# Patient Record
Sex: FEMALE | NOT HISPANIC OR LATINO | URBAN - METROPOLITAN AREA
[De-identification: names, ages, dates, MRNs, and addresses within clinical notes are randomized per-mention and may not be internally consistent; named-entity substitution may affect disease eponyms.]

---

## 2018-06-04 ENCOUNTER — OFFICE VISIT (OUTPATIENT)
Dept: URBAN - METROPOLITAN AREA CLINIC 32 | Facility: CLINIC | Age: 73
End: 2018-06-04
Payer: MEDICARE

## 2018-06-04 PROCEDURE — 67028 INJECTION EYE DRUG: CPT | Performed by: OPHTHALMOLOGY

## 2018-06-04 PROCEDURE — 92134 CPTRZ OPH DX IMG PST SGM RTA: CPT | Performed by: OPHTHALMOLOGY

## 2018-06-04 PROCEDURE — 92014 COMPRE OPH EXAM EST PT 1/>: CPT | Performed by: OPHTHALMOLOGY

## 2018-06-04 ASSESSMENT — INTRAOCULAR PRESSURE
OS: 11
OD: 11

## 2018-06-04 NOTE — IMPRESSION/PLAN
Impression: Central retinal vein occlusion, left eye, with macular edema: H34.8120. OS. Condition: unstable. Vision: vision affected. Plan: Discussed diagnosis in detail with patient. Discussed risks of progression. Based on today's exam, diagnostic studies and review of records, recommend Intravitreal Injection Treatment to help reduce the swelling in order to prevent a further reduction in vision. An examination that was significantly and separately identifiable from the procedure was performed today. Discussed the risks and benefits of tx. Patient elects to proceed with recommendation. OCT shows CME centrally and peripapillary OS.

## 2018-06-04 NOTE — IMPRESSION/PLAN
Impression: Dry eye syndrome of bilateral lacrimal glands: H04.123. OU. Plan: Dry eyes account for the patient's complaints. There is no evidence of permanent changes to the cornea. Explained condition does not have a cure and will need artificial tears for maintenance.

## 2018-06-20 ENCOUNTER — OFFICE VISIT (OUTPATIENT)
Dept: URBAN - METROPOLITAN AREA CLINIC 32 | Facility: CLINIC | Age: 73
End: 2018-06-20
Payer: MEDICARE

## 2018-06-20 PROCEDURE — 99214 OFFICE O/P EST MOD 30 MIN: CPT | Performed by: OPTOMETRIST

## 2018-06-20 ASSESSMENT — INTRAOCULAR PRESSURE
OS: 12
OD: 10

## 2018-06-20 NOTE — IMPRESSION/PLAN
Impression: Central retinal vein occlusion, left eye, with macular edema: H34.8120. OS. Condition: unstable. Vision: vision affected. Plan: Discussed diagnosis in detail with patient. Discussed risks of progression. Based on today's exam, diagnostic studies and review of records, recommend Intravitreal Injection Treatment to help reduce the swelling in order to prevent a further reduction in vision.  had injection 2 weeks ago

## 2018-08-27 ENCOUNTER — OFFICE VISIT (OUTPATIENT)
Dept: URBAN - METROPOLITAN AREA CLINIC 32 | Facility: CLINIC | Age: 73
End: 2018-08-27
Payer: MEDICARE

## 2018-08-27 PROCEDURE — 99213 OFFICE O/P EST LOW 20 MIN: CPT | Performed by: OPHTHALMOLOGY

## 2018-08-27 PROCEDURE — 92134 CPTRZ OPH DX IMG PST SGM RTA: CPT | Performed by: OPHTHALMOLOGY

## 2018-08-27 PROCEDURE — 67028 INJECTION EYE DRUG: CPT | Performed by: OPHTHALMOLOGY

## 2018-08-27 ASSESSMENT — INTRAOCULAR PRESSURE
OD: 12
OS: 12

## 2018-08-27 NOTE — IMPRESSION/PLAN
Impression: Central retinal vein occlusion, left eye, with macular edema: H34.8120. OS. Condition: unstable. Vision: vision affected. s/p AV OS #1  06/04/2018. Plan: Discussed diagnosis in detail with patient. Discussed risks of progression. Based on today's exam, diagnostic studies and review of records, recommend to continue with Intravitreal Injection Treatment to help reduce the swelling and bleeding  in order to prevent a further reduction in vision. An examination that was significantly and separately identifiable from the procedure was performed today. Discussed the risks and benefits of tx. Patient elects to proceed with recommendation. OCT shows poor registration OS.

## 2018-10-01 ENCOUNTER — OFFICE VISIT (OUTPATIENT)
Dept: URBAN - METROPOLITAN AREA CLINIC 32 | Facility: CLINIC | Age: 73
End: 2018-10-01
Payer: MEDICARE

## 2018-10-01 DIAGNOSIS — H04.123 DRY EYE SYNDROME OF BILATERAL LACRIMAL GLANDS: ICD-10-CM

## 2018-10-01 PROCEDURE — 92134 CPTRZ OPH DX IMG PST SGM RTA: CPT | Performed by: OPHTHALMOLOGY

## 2018-10-01 PROCEDURE — 67028 INJECTION EYE DRUG: CPT | Performed by: OPHTHALMOLOGY

## 2018-10-01 PROCEDURE — 99213 OFFICE O/P EST LOW 20 MIN: CPT | Performed by: OPHTHALMOLOGY

## 2018-10-01 ASSESSMENT — INTRAOCULAR PRESSURE
OD: 13
OS: 14

## 2018-10-01 NOTE — IMPRESSION/PLAN
Impression: Central retinal vein occlusion, left eye, with macular edema: H34.8120. OS. Condition: unstable. Vision: vision affected. s/p AV OS #2 8/27/2018,#1  06/04/2018. Plan: Discussed diagnosis in detail with patient. Discussed risks of progression. Based on today's exam, diagnostic studies and review of records, recommend to continue with Intravitreal Injection Treatment AVASTIN in LEFT EYE to help reduce the swelling and bleeding  in order to prevent a further reduction in vision. An examination that was significantly and separately identifiable from the procedure was performed today. Discussed the risks and benefits of tx. Patient elects to proceed with recommendation. OCT shows poor registration OS.

## 2019-01-28 ENCOUNTER — OFFICE VISIT (OUTPATIENT)
Dept: URBAN - METROPOLITAN AREA CLINIC 32 | Facility: CLINIC | Age: 74
End: 2019-01-28
Payer: MEDICARE

## 2019-01-28 PROCEDURE — 92134 CPTRZ OPH DX IMG PST SGM RTA: CPT | Performed by: OPHTHALMOLOGY

## 2019-01-28 PROCEDURE — 99213 OFFICE O/P EST LOW 20 MIN: CPT | Performed by: OPHTHALMOLOGY

## 2019-01-28 NOTE — IMPRESSION/PLAN
Impression: Central retinal vein occlusion, left eye, with macular edema: H34.8120. OS. Condition: unstable. Vision: vision affected. s/p AV OS # 3 10/1/2018, #2 8/27/2018,#1  06/04/2018. Plan: Discussed diagnosis in detail with patient. Discussed risks of progression. Based on today's exam, diagnostic studies and review of records, recommend to continue with Intravitreal Injection Treatment AVASTIN in LEFT EYE to help reduce the swelling and bleeding  in order to prevent a further reduction in vision. Discussed the risks and benefits of tx. Patient elects to proceed with recommendation. OCT shows ? decreased leakage OS since 10/1/18 Exam and OCT show the macula is stable OD.

## 2019-02-25 ENCOUNTER — OFFICE VISIT (OUTPATIENT)
Dept: URBAN - METROPOLITAN AREA CLINIC 32 | Facility: CLINIC | Age: 74
End: 2019-02-25
Payer: MEDICARE

## 2019-02-25 PROCEDURE — 67028 INJECTION EYE DRUG: CPT | Performed by: OPHTHALMOLOGY

## 2019-02-25 PROCEDURE — 92134 CPTRZ OPH DX IMG PST SGM RTA: CPT | Performed by: OPHTHALMOLOGY

## 2019-02-25 PROCEDURE — 99213 OFFICE O/P EST LOW 20 MIN: CPT | Performed by: OPHTHALMOLOGY

## 2019-02-25 ASSESSMENT — INTRAOCULAR PRESSURE
OD: 12
OS: 12

## 2019-02-25 NOTE — IMPRESSION/PLAN
Impression: Central retinal vein occlusion, left eye, with macular edema: H34.8120. OS. Condition: unstable. Vision: vision affected. s/p AV OS # 3 10/1/2018, #2 8/27/2018,#1  06/04/2018. Plan: Discussed diagnosis in detail with patient. Discussed risks of progression. Based on today's exam, diagnostic studies and review of records, recommend to continue with Intravitreal Injection Treatment of AVASTIN in LEFT EYE to help reduce the swelling and bleeding  in order to prevent a further reduction in vision. Discussed the risks and benefits of tx. Patient elects to proceed with recommendation. OCT shows active edema associated with CRVO OS. An examination that was significantly and separately identifiable from the procedure was performed today. Exam and OCT OD shows the macula is stable, recommend observation.

## 2019-04-15 ENCOUNTER — OFFICE VISIT (OUTPATIENT)
Dept: URBAN - METROPOLITAN AREA CLINIC 32 | Facility: CLINIC | Age: 74
End: 2019-04-15
Payer: MEDICARE

## 2019-04-15 PROCEDURE — 99213 OFFICE O/P EST LOW 20 MIN: CPT | Performed by: OPHTHALMOLOGY

## 2019-04-15 PROCEDURE — 67028 INJECTION EYE DRUG: CPT | Performed by: OPHTHALMOLOGY

## 2019-04-15 PROCEDURE — 92134 CPTRZ OPH DX IMG PST SGM RTA: CPT | Performed by: OPHTHALMOLOGY

## 2019-04-15 ASSESSMENT — INTRAOCULAR PRESSURE
OS: 13
OD: 12

## 2019-04-15 NOTE — IMPRESSION/PLAN
Impression: Central retinal vein occlusion, left eye, with macular edema: H34.8120. OS. Condition: unstable. Vision: vision affected. s/p AV OS #4 2/25/19, # 3 10/1/2018, #2 8/27/2018,#1  06/04/2018. Plan: Discussed diagnosis in detail with patient. Discussed risks of progression. Based on today's exam, diagnostic studies and review of records, recommend to continue with Intravitreal Injection Treatment of AVASTIN in LEFT EYE to help reduce the swelling and bleeding  in order to prevent a further reduction in vision. Discussed the risks and benefits of tx. Patient elects to proceed with recommendation. OCT shows active edema associated with CRVO OS. An examination that was significantly and separately identifiable from the procedure was performed today. Exam and OCT OD shows the macula is stable, recommend observation.

## 2019-05-13 ENCOUNTER — OFFICE VISIT (OUTPATIENT)
Dept: URBAN - METROPOLITAN AREA CLINIC 32 | Facility: CLINIC | Age: 74
End: 2019-05-13
Payer: MEDICARE

## 2019-05-13 PROCEDURE — 99213 OFFICE O/P EST LOW 20 MIN: CPT | Performed by: OPHTHALMOLOGY

## 2019-05-13 PROCEDURE — 67028 INJECTION EYE DRUG: CPT | Performed by: OPHTHALMOLOGY

## 2019-05-13 PROCEDURE — 92134 CPTRZ OPH DX IMG PST SGM RTA: CPT | Performed by: OPHTHALMOLOGY

## 2019-05-13 ASSESSMENT — INTRAOCULAR PRESSURE
OD: 13
OS: 13

## 2019-05-13 NOTE — IMPRESSION/PLAN
Impression: Central retinal vein occlusion, left eye, with macular edema: H34.8120. OS. Condition: unstable. Vision: vision affected. s/p AV OS#5 4/15/2019, #4 2/25/19, # 3 10/1/2018, #2 8/27/2018,#1  06/04/2018. Plan: Discussed diagnosis in detail with patient. Discussed risks of progression. Based on today's exam, diagnostic studies and review of records, recommend to continue with Intravitreal Injection Treatment of AVASTIN in LEFT EYE to help reduce the swelling and bleeding  in order to prevent a further reduction in vision. Discussed the risks and benefits of tx. Patient elects to proceed with recommendation. OCT shows active persistent edema associated with CRVO, but decreasing OS. An examination that was significantly and separately identifiable from the procedure was performed today. Exam and OCT OD shows the macula is stable, recommend observation.

## 2019-06-10 ENCOUNTER — OFFICE VISIT (OUTPATIENT)
Dept: URBAN - METROPOLITAN AREA CLINIC 32 | Facility: CLINIC | Age: 74
End: 2019-06-10
Payer: MEDICARE

## 2019-06-10 PROCEDURE — 92134 CPTRZ OPH DX IMG PST SGM RTA: CPT | Performed by: OPHTHALMOLOGY

## 2019-06-10 PROCEDURE — 99213 OFFICE O/P EST LOW 20 MIN: CPT | Performed by: OPHTHALMOLOGY

## 2019-06-10 PROCEDURE — 67028 INJECTION EYE DRUG: CPT | Performed by: OPHTHALMOLOGY

## 2019-06-10 ASSESSMENT — INTRAOCULAR PRESSURE
OS: 14
OD: 12

## 2019-06-10 NOTE — IMPRESSION/PLAN
Impression: Central retinal vein occlusion, left eye, with macular edema: H34.8120. OS. Condition: unstable. Vision: vision affected. s/p AV OS 05/13/19 Plan: Discussed diagnosis in detail with patient. Discussed risks of progression. Based on today's exam, diagnostic studies and review of records, recommend to start with Intravitreal Injection Treatment of KENALOG in LEFT EYE to help reduce the swelling and bleeding  in order to prevent a further reduction in vision. Discussed the risks and benefits of tx. Patient elects to proceed with recommendation. OCT shows active persistent edema associated with CRVO. An examination that was significantly and separately identifiable from the procedure was performed today. Exam and OCT OD shows the macula is stable, recommend observation.

## 2019-06-18 ENCOUNTER — OFFICE VISIT (OUTPATIENT)
Dept: URBAN - METROPOLITAN AREA CLINIC 32 | Facility: CLINIC | Age: 74
End: 2019-06-18
Payer: MEDICARE

## 2019-06-18 PROCEDURE — 99214 OFFICE O/P EST MOD 30 MIN: CPT | Performed by: OPTOMETRIST

## 2019-06-18 ASSESSMENT — INTRAOCULAR PRESSURE
OD: 12
OS: 13

## 2019-06-18 NOTE — IMPRESSION/PLAN
Impression: Central retinal vein occlusion, left eye, with macular edema: H34.8120. OS. Condition: unstable. Vision: vision affected. s/p AV OS#5 4/15/2019, #4 2/25/19, # 3 10/1/2018, #2 8/27/2018,#1  06/04/2018. Plan: Discussed diagnosis in detail with patient. Discussed risks of progression.  Based on today's exam, diagnostic studies and review of records RTC for eval with Dr Kassandra Mustafa

## 2019-08-12 ENCOUNTER — OFFICE VISIT (OUTPATIENT)
Dept: URBAN - METROPOLITAN AREA CLINIC 32 | Facility: CLINIC | Age: 74
End: 2019-08-12
Payer: MEDICARE

## 2019-08-12 DIAGNOSIS — H34.8120 CENTRAL RETINAL VEIN OCCLUSION, LEFT EYE, WITH MACULAR EDEMA: Primary | ICD-10-CM

## 2019-08-12 PROCEDURE — 92134 CPTRZ OPH DX IMG PST SGM RTA: CPT | Performed by: OPHTHALMOLOGY

## 2019-08-12 PROCEDURE — 99213 OFFICE O/P EST LOW 20 MIN: CPT | Performed by: OPHTHALMOLOGY

## 2019-08-12 ASSESSMENT — INTRAOCULAR PRESSURE
OD: 12
OS: 12

## 2019-08-12 NOTE — IMPRESSION/PLAN
Impression: Central retinal vein occlusion, left eye, with macular edema: H34.8120. OS. Condition: unstable. Vision: vision affected. s/p Bianca Pinta 06/10/2019, s/p AV OS 05/13/19, s/p SV OS 04/15/2019 Plan: Discussed diagnosis in detail with patient. Discussed risks of progression. Exam ands OCT OS show persistent edema, not responding to TUAN tx with Kenalog. Recommend a follow - up in 6 wks to reassess condition.

## 2019-09-23 ENCOUNTER — OFFICE VISIT (OUTPATIENT)
Dept: URBAN - METROPOLITAN AREA CLINIC 32 | Facility: CLINIC | Age: 74
End: 2019-09-23
Payer: MEDICARE

## 2019-09-23 PROCEDURE — 92134 CPTRZ OPH DX IMG PST SGM RTA: CPT | Performed by: OPHTHALMOLOGY

## 2019-09-23 PROCEDURE — 99213 OFFICE O/P EST LOW 20 MIN: CPT | Performed by: OPHTHALMOLOGY

## 2019-09-23 ASSESSMENT — INTRAOCULAR PRESSURE
OD: 17
OS: 19

## 2019-09-23 NOTE — IMPRESSION/PLAN
Impression: Central retinal vein occlusion, left eye, with macular edema: H34.8120. OS. Condition: improving. Vision: vision affected. s/p Srinivasa Born 06/10/2019, s/p AV OS 05/13/19, s/p SV OS 04/15/2019 Plan: Discussed diagnosis in detail with patient. Discussed risks of progression. Exam and OCT OS show a decrease in edema. No treatment is required at this time. Will continue to observe condition and or symptoms.

## 2019-11-04 ENCOUNTER — OFFICE VISIT (OUTPATIENT)
Dept: URBAN - METROPOLITAN AREA CLINIC 32 | Facility: CLINIC | Age: 74
End: 2019-11-04
Payer: MEDICARE

## 2019-11-04 PROCEDURE — 67028 INJECTION EYE DRUG: CPT | Performed by: OPHTHALMOLOGY

## 2019-11-04 PROCEDURE — 99213 OFFICE O/P EST LOW 20 MIN: CPT | Performed by: OPHTHALMOLOGY

## 2019-11-04 PROCEDURE — 92134 CPTRZ OPH DX IMG PST SGM RTA: CPT | Performed by: OPHTHALMOLOGY

## 2019-11-04 ASSESSMENT — INTRAOCULAR PRESSURE
OD: 10
OS: 11

## 2019-11-04 NOTE — IMPRESSION/PLAN
Impression: Central retinal vein occlusion, left eye, with macular edema: H34.8120. OS. Condition: worsening. Vision: vision affected. s/p Deyanne Pickup 06/10/2019, s/p AV OS 05/13/19, s/p SV OS 04/15/2019 Plan: Discussed diagnosis in detail with patient. Discussed risks of progression. Based on today's exam, diagnostic studies and review of records, recommend TUAN tx with AVASTIN and KENALOG in order to help reduce the swelling and prevent a further reduction in vision. An examination that was significantly and separately identifiable from the procedure was performed today. Discussed the risks and benefits of tx. Patient elects to proceed with recommendation. OCT shows an increase in swelling OS. Patient may need additional TUAN tx or laser treatment in the future.

## 2019-12-16 ENCOUNTER — OFFICE VISIT (OUTPATIENT)
Dept: URBAN - METROPOLITAN AREA CLINIC 32 | Facility: CLINIC | Age: 74
End: 2019-12-16
Payer: MEDICARE

## 2019-12-16 PROCEDURE — 99213 OFFICE O/P EST LOW 20 MIN: CPT | Performed by: OPHTHALMOLOGY

## 2019-12-16 PROCEDURE — 92134 CPTRZ OPH DX IMG PST SGM RTA: CPT | Performed by: OPHTHALMOLOGY

## 2019-12-16 ASSESSMENT — INTRAOCULAR PRESSURE
OD: 12
OS: 11

## 2019-12-16 NOTE — IMPRESSION/PLAN
Impression: Central retinal vein occlusion, left eye, with macular edema: H34.8120. OS. Condition: improving. Vision: vision affected. s/p AV/Jaspal OS 11/4/2019, Jaspal OS 06/10/2019, s/p AV OS 05/13/19, s/p SV OS 04/15/2019 Plan: Discussed diagnosis in detail with patient. No treatment is required at this time based on exam and OCT. Recommend observation for now. Will reassess condition in 6 wks. OCT shows decreased edema OS Patient states she is having diplopia, advised patient once her left eye is more stable will consider scheduling her for a Strabismus consult.

## 2020-01-27 ENCOUNTER — OFFICE VISIT (OUTPATIENT)
Dept: URBAN - METROPOLITAN AREA CLINIC 32 | Facility: CLINIC | Age: 75
End: 2020-01-27
Payer: MEDICARE

## 2020-01-27 PROCEDURE — 92134 CPTRZ OPH DX IMG PST SGM RTA: CPT | Performed by: OPHTHALMOLOGY

## 2020-01-27 PROCEDURE — 99213 OFFICE O/P EST LOW 20 MIN: CPT | Performed by: OPHTHALMOLOGY

## 2020-01-27 NOTE — IMPRESSION/PLAN
Impression: Central retinal vein occlusion, left eye, with macular edema: H34.8120. OS. Condition: stable. Vision: vision affected. s/p AV/Jaspal OS 11/4/2019, Jaspal OS 06/10/2019, s/p AV OS 05/13/19, s/p SV OS 04/15/2019 Plan: Discussed diagnosis in detail with patient. No treatment is required at this time based on exam and OCT. Recommend observation for now. Will reassess condition in 6 wks. OCT shows a mild increase in CMT OS For ARIELLE recommend artificial tears OU QID/prn.

## 2020-05-18 ENCOUNTER — OFFICE VISIT (OUTPATIENT)
Dept: URBAN - METROPOLITAN AREA CLINIC 32 | Facility: CLINIC | Age: 75
End: 2020-05-18
Payer: MEDICARE

## 2020-05-18 PROCEDURE — 92134 CPTRZ OPH DX IMG PST SGM RTA: CPT | Performed by: OPHTHALMOLOGY

## 2020-05-18 PROCEDURE — 67028 INJECTION EYE DRUG: CPT | Performed by: OPHTHALMOLOGY

## 2020-05-18 PROCEDURE — 99213 OFFICE O/P EST LOW 20 MIN: CPT | Performed by: OPHTHALMOLOGY

## 2020-05-18 ASSESSMENT — INTRAOCULAR PRESSURE
OD: 11
OS: 11

## 2020-05-18 NOTE — IMPRESSION/PLAN
Impression: Central retinal vein occlusion, left eye, with macular edema: H34.8120. OS. Condition: unstable. Vision: vision affected. s/p AV/Jaspal OS 11/4/2019, Jaspal OS 06/10/2019, s/p AV OS 05/13/19, s/p SV OS 04/15/2019 Plan: Due to Coronavirus COVID-19 pandemic and National Emergency, deferred Slit Lamp examination. Findings are based on OCT. OCT OS shows increased edema OS. Based on findings recommend to restart Intravitreal Injection Treatment AV OS  to help reduce the fluid and prevent a further reduction in vision. Discussed the risks and benefits of tx. All questions answered. Patient elects to proceed with recommendation.

## 2020-06-29 ENCOUNTER — OFFICE VISIT (OUTPATIENT)
Dept: URBAN - METROPOLITAN AREA CLINIC 32 | Facility: CLINIC | Age: 75
End: 2020-06-29
Payer: MEDICARE

## 2020-06-29 PROCEDURE — 92134 CPTRZ OPH DX IMG PST SGM RTA: CPT | Performed by: OPHTHALMOLOGY

## 2020-06-29 PROCEDURE — 99213 OFFICE O/P EST LOW 20 MIN: CPT | Performed by: OPHTHALMOLOGY

## 2020-06-29 ASSESSMENT — INTRAOCULAR PRESSURE
OD: 11
OS: 14

## 2020-06-29 NOTE — IMPRESSION/PLAN
Impression: Central retinal vein occlusion, left eye, with macular edema: H34.8120. OS. Condition: unstable. Vision: vision affected. s/p AV OS 5/18/2020, AV/Jaspal OS 11/4/2019, Jaspal OS 06/10/2019, s/p AV OS 05/13/19, s/p SV OS 04/15/2019 Plan: Due to Coronavirus COVID-19 pandemic and National Emergency, deferred Slit Lamp examination. Findings are based on OCT. OCT OS shows active edema, minimal change OS. Based on findings recommend to restart Intravitreal Injection Treatment KENALOG OS  to help reduce the fluid and prevent a further reduction in vision. Discussed the risks and benefits of tx. All questions answered. Patient elects to proceed with recommendation.

## 2020-07-09 ENCOUNTER — PROCEDURE (OUTPATIENT)
Dept: URBAN - METROPOLITAN AREA CLINIC 33 | Facility: CLINIC | Age: 75
End: 2020-07-09
Payer: MEDICARE

## 2020-07-09 DIAGNOSIS — H35.352 CYSTOID MACULAR DEGENERATION, LEFT EYE: Primary | ICD-10-CM

## 2020-07-09 PROCEDURE — 67028 INJECTION EYE DRUG: CPT | Performed by: OPHTHALMOLOGY

## 2020-08-24 ENCOUNTER — OFFICE VISIT (OUTPATIENT)
Dept: URBAN - METROPOLITAN AREA CLINIC 32 | Facility: CLINIC | Age: 75
End: 2020-08-24
Payer: MEDICARE

## 2020-08-24 PROCEDURE — 92134 CPTRZ OPH DX IMG PST SGM RTA: CPT | Performed by: OPHTHALMOLOGY

## 2020-08-24 PROCEDURE — 99213 OFFICE O/P EST LOW 20 MIN: CPT | Performed by: OPHTHALMOLOGY

## 2020-08-24 ASSESSMENT — INTRAOCULAR PRESSURE
OS: 16
OD: 16

## 2020-08-24 NOTE — IMPRESSION/PLAN
Impression: Central retinal vein occlusion, left eye, with macular edema: H34.8120. OS. Condition: improving. Vision: vision affected. s/p COREY OS 07/09/2020, AV OS 5/18/2020, AV/Corey OS 11/4/2019, Corey OS 06/10/2019 . Helen Smith Plan: Due to Coronavirus COVID-19 pandemic and National Emergency, deferred Slit Lamp examination. Findings are based on OCT. OCT shows a decrease in CMT OS. No treatment is require at this time. Recommend a retina follow - up in 6 wks.

## 2020-11-02 ENCOUNTER — OFFICE VISIT (OUTPATIENT)
Dept: URBAN - METROPOLITAN AREA CLINIC 32 | Facility: CLINIC | Age: 75
End: 2020-11-02
Payer: MEDICARE

## 2020-11-02 PROCEDURE — 92134 CPTRZ OPH DX IMG PST SGM RTA: CPT | Performed by: OPHTHALMOLOGY

## 2020-11-02 PROCEDURE — 92014 COMPRE OPH EXAM EST PT 1/>: CPT | Performed by: OPHTHALMOLOGY

## 2020-12-08 ENCOUNTER — SURGERY (OUTPATIENT)
Dept: URBAN - METROPOLITAN AREA SURGERY 11 | Facility: SURGERY | Age: 75
End: 2020-12-08
Payer: MEDICARE

## 2020-12-08 PROCEDURE — 67027 IMPLANT EYE DRUG SYSTEM: CPT | Performed by: OPHTHALMOLOGY

## 2021-01-11 ENCOUNTER — POST-OPERATIVE VISIT (OUTPATIENT)
Dept: URBAN - METROPOLITAN AREA CLINIC 32 | Facility: CLINIC | Age: 76
End: 2021-01-11

## 2021-01-11 DIAGNOSIS — Z48.810 ENCOUNTER FOR SURGICAL AFTERCARE FOLLOWING SURGERY ON A SENSE ORGAN: Primary | ICD-10-CM

## 2021-01-11 PROCEDURE — 99024 POSTOP FOLLOW-UP VISIT: CPT | Performed by: OPHTHALMOLOGY

## 2021-01-11 ASSESSMENT — INTRAOCULAR PRESSURE
OS: 21
OD: 15

## 2021-01-11 NOTE — IMPRESSION/PLAN
Impression: S/P Intravitreal Ozurdex Implant OS - 34 Days. Encounter for surgical aftercare following surgery on a sense organ  Z48.810. Excellent post op course   Post operative instructions reviewed - Condition is improving - Plan: Due to Coronavirus COVID-19 pandemic and National Emergency, deferred Slit Lamp examination. Findings are based on OCT. OCT shows a marked decrease in edema OS. Recommend a retina follow - up in 2 mos. Dr. Sherita Chase spoke with patient's son over the phone per patient's request to update him on the procedure she had on 12/08/2020 and the reason why she needed the procedure and what her outcome shows today improvement.

## 2021-03-22 ENCOUNTER — POST-OPERATIVE VISIT (OUTPATIENT)
Dept: URBAN - METROPOLITAN AREA CLINIC 32 | Facility: CLINIC | Age: 76
End: 2021-03-22
Payer: MEDICARE

## 2021-03-22 PROCEDURE — 99213 OFFICE O/P EST LOW 20 MIN: CPT | Performed by: OPHTHALMOLOGY

## 2021-03-22 PROCEDURE — 92134 CPTRZ OPH DX IMG PST SGM RTA: CPT | Performed by: OPHTHALMOLOGY

## 2021-03-22 ASSESSMENT — INTRAOCULAR PRESSURE
OS: 15
OD: 15

## 2021-03-22 NOTE — IMPRESSION/PLAN
Impression: Central retinal vein occlusion, left eye, with macular edema: H34.8120. OS. Condition: stable. Vision: vision affected. s/p Ozurdex OS 12/08/2020
s/p COREY OS 07/09/2020, AV OS 5/18/2020, AV/Corey OS 11/4/2019, Corey OS 06/10/2019 . Sherita Narayanan Plan: Discussed diagnosis in detail with patient. Exam OS shows minimal edema and OCT confirms findings. No treatment is required at this time. Will continue to observe condition and or symptoms. Recommend a retina follow - up in 2 mos, Patient can proceed with a refraction.

## 2021-04-09 ENCOUNTER — OFFICE VISIT (OUTPATIENT)
Dept: URBAN - METROPOLITAN AREA CLINIC 32 | Facility: CLINIC | Age: 76
End: 2021-04-09
Payer: MEDICARE

## 2021-04-09 DIAGNOSIS — H52.4 PRESBYOPIA: ICD-10-CM

## 2021-04-09 PROCEDURE — 99214 OFFICE O/P EST MOD 30 MIN: CPT | Performed by: OPTOMETRIST

## 2021-04-09 ASSESSMENT — KERATOMETRY
OD: 42.88
OS: 43.63

## 2021-04-09 ASSESSMENT — VISUAL ACUITY
OD: 20/20
OS: CF 5FT

## 2021-04-09 NOTE — IMPRESSION/PLAN
Impression: Central retinal vein occlusion, left eye, with macular edema: H34.8120. OS. Condition: stable. Vision: vision affected. s/p Ozurdex OS 12/08/2020
s/p COREY OS 07/09/2020, AV OS 5/18/2020, AV/Corey OS 11/4/2019, Corey OS 06/10/2019 . [facility]: Discussed diagnosis in detail with patient. Exam OS shows minimal edema and OCT confirms findings. No treatment is required at this time. Will continue to observe condition and or symptoms.  cont with Dr Devan Parker

## 2021-05-17 ENCOUNTER — OFFICE VISIT (OUTPATIENT)
Dept: URBAN - METROPOLITAN AREA CLINIC 32 | Facility: CLINIC | Age: 76
End: 2021-05-17
Payer: MEDICARE

## 2021-05-17 PROCEDURE — 99214 OFFICE O/P EST MOD 30 MIN: CPT | Performed by: OPHTHALMOLOGY

## 2021-05-17 PROCEDURE — 92134 CPTRZ OPH DX IMG PST SGM RTA: CPT | Performed by: OPHTHALMOLOGY

## 2021-05-17 ASSESSMENT — INTRAOCULAR PRESSURE
OD: 16
OS: 18

## 2021-05-17 NOTE — IMPRESSION/PLAN
Impression: Central retinal vein occlusion, left eye, with macular edema: H34.8120. OS. Condition: worsening. Vision: vision affected. s/p Ozurdex OS 12/08/2020
s/p COREY OS 07/09/2020, AV OS 5/18/2020, AV/Corey OS 11/4/2019, Corey OS 06/10/2019 . Alla Leaver Plan: Discussed diagnosis in detail with patient. Discussed risks of progression. Based on exam and OCT recommend Ozurdex implant in order to help reduce the swelling and prevent a further reduction in vision. Discussed the risks and benefits of tx including the possibility of increase in IOP. All questions answered. Patient elects to proceed with recommendation. RL1. OCT shows an increase in edema OS.

## 2021-08-11 ENCOUNTER — SURGERY (OUTPATIENT)
Dept: URBAN - METROPOLITAN AREA SURGERY 15 | Facility: SURGERY | Age: 76
End: 2021-08-11
Payer: MEDICARE

## 2021-08-11 PROCEDURE — 67027 IMPLANT EYE DRUG SYSTEM: CPT | Performed by: OPHTHALMOLOGY

## 2021-08-12 ENCOUNTER — POST-OPERATIVE VISIT (OUTPATIENT)
Dept: URBAN - METROPOLITAN AREA CLINIC 32 | Facility: CLINIC | Age: 76
End: 2021-08-12

## 2021-08-12 PROCEDURE — 99024 POSTOP FOLLOW-UP VISIT: CPT | Performed by: OPTOMETRIST

## 2021-08-12 ASSESSMENT — INTRAOCULAR PRESSURE
OD: 12
OS: 15

## 2021-08-12 NOTE — IMPRESSION/PLAN
Impression: S/P OZURDEX INJECTION OS - 1 Day. Encounter for surgical aftercare following surgery on a sense organ  Z48.810. Post operative instructions reviewed - Plan: The surgical eye(s) is improving well. Continue to follow current drop plan and post operative instructions. Recommend artificial tears throughout post operative period. RTC for scheduled follow up. --Advised patient to use artificial tears for comfort.

## 2021-08-23 ENCOUNTER — POST-OPERATIVE VISIT (OUTPATIENT)
Dept: URBAN - METROPOLITAN AREA CLINIC 32 | Facility: CLINIC | Age: 76
End: 2021-08-23

## 2021-08-23 PROCEDURE — 99024 POSTOP FOLLOW-UP VISIT: CPT | Performed by: OPHTHALMOLOGY

## 2021-08-23 ASSESSMENT — INTRAOCULAR PRESSURE
OS: 16
OD: 16

## 2021-08-23 NOTE — IMPRESSION/PLAN
Impression: S/P OZURDEX INJECTION OS - 12 Days. Encounter for surgical aftercare following surgery on a sense organ  Z48.810. Excellent post op course   Post operative instructions reviewed - Condition is improving - Plan: Due to Coronavirus COVID-19 pandemic and National Emergency, deferred Slit Lamp examination. Findings are based on diagnostic test. OCT shows a marked decrease in CMT OS. Recommend a retina follow - up in 6 wks.

## 2021-10-18 ENCOUNTER — POST-OPERATIVE VISIT (OUTPATIENT)
Dept: URBAN - METROPOLITAN AREA CLINIC 32 | Facility: CLINIC | Age: 76
End: 2021-10-18

## 2021-10-18 PROCEDURE — 99024 POSTOP FOLLOW-UP VISIT: CPT | Performed by: OPHTHALMOLOGY

## 2021-10-18 ASSESSMENT — INTRAOCULAR PRESSURE
OD: 12
OS: 18

## 2021-10-18 NOTE — IMPRESSION/PLAN
Impression: S/P OZURDEX INJECTION OS - 68 Days. Encounter for surgical aftercare following surgery on a sense organ  Z48.810. Excellent post op course   Post operative instructions reviewed - Condition is improving - Plan: S/P OZURDEX INJECTION OS - 68 Days. Encounter for surgical aftercare following surgery on a sense organ  Z48.810. Excellent post op course   Post operative instructions reviewed - Condition is improving - Due to Coronavirus COVID-19 pandemic and National Emergency, deferred Slit Lamp examination. Findings are based on diagnostic tests. OCT shows irregular fovea contour no significant edema OD. No treatment is require at this time. Recommend a retina follow - up in 2 - 3 mos. --Advised patient to use artificial tears for comfort.

## 2021-12-27 ENCOUNTER — OFFICE VISIT (OUTPATIENT)
Dept: URBAN - METROPOLITAN AREA CLINIC 32 | Facility: CLINIC | Age: 76
End: 2021-12-27
Payer: MEDICARE

## 2021-12-27 PROCEDURE — 99214 OFFICE O/P EST MOD 30 MIN: CPT | Performed by: OPHTHALMOLOGY

## 2021-12-27 PROCEDURE — 92134 CPTRZ OPH DX IMG PST SGM RTA: CPT | Performed by: OPHTHALMOLOGY

## 2021-12-27 ASSESSMENT — INTRAOCULAR PRESSURE
OD: 14
OS: 14

## 2021-12-27 NOTE — IMPRESSION/PLAN
Impression: Central retinal vein occlusion, left eye, with macular edema: H34.8120. OS. Condition: worsening. Vision: vision affected. s/p Ozurdex Implant OS 08/11/2021, Ozurdex OS 12/08/2020
s/p COREY OS 07/09/2020, AV OS 5/18/2020, AV/Corey OS 11/4/2019, Corey OS 06/10/2019 . Jeff Awktonya Plan: Discussed diagnosis in detail with patient. Discussed risks of progression. Based on exam and OCT recommend Ozurdex implant LEFT EYE in order to help reduce the swelling and prevent a further reduction in vision. Discussed the risks and benefits of tx including the possibility of increase in IOP. All questions answered. Patient elects to proceed with recommendation. RL1. OCT shows an increase in edema OS.

## 2022-01-26 ENCOUNTER — SURGERY (OUTPATIENT)
Dept: URBAN - METROPOLITAN AREA SURGERY 15 | Facility: SURGERY | Age: 77
End: 2022-01-26
Payer: MEDICARE

## 2022-01-26 PROCEDURE — 67027 IMPLANT EYE DRUG SYSTEM: CPT | Performed by: OPHTHALMOLOGY

## 2022-03-03 ENCOUNTER — POST-OPERATIVE VISIT (OUTPATIENT)
Dept: URBAN - METROPOLITAN AREA CLINIC 33 | Facility: CLINIC | Age: 77
End: 2022-03-03
Payer: MEDICARE

## 2022-03-03 PROCEDURE — 99024 POSTOP FOLLOW-UP VISIT: CPT | Performed by: OPHTHALMOLOGY

## 2022-03-03 ASSESSMENT — INTRAOCULAR PRESSURE
OD: 14
OS: 14

## 2022-03-03 NOTE — IMPRESSION/PLAN
Impression: S/P Intravitreal Ozurdex Implant; ; 51224; 26929 OS - 36 Days. Encounter for surgical aftercare following surgery on a sense organ  Z48.810. Excellent post op course   Condition is improving - Plan: Due to Coronavirus COVID-19 pandemic and National Emergency, slit lamp exam deferred. Findings based on diagnostic testing. OCT OS shows minimal edema. Will reassess in 2 mos.

## 2022-05-02 ENCOUNTER — OFFICE VISIT (OUTPATIENT)
Dept: URBAN - METROPOLITAN AREA CLINIC 32 | Facility: CLINIC | Age: 77
End: 2022-05-02
Payer: MEDICARE

## 2022-05-02 DIAGNOSIS — H34.8120 CENTRAL RETINAL VEIN OCCLUSION, LEFT EYE, WITH MACULAR EDEMA: Primary | ICD-10-CM

## 2022-05-02 PROCEDURE — 99213 OFFICE O/P EST LOW 20 MIN: CPT | Performed by: OPHTHALMOLOGY

## 2022-05-02 PROCEDURE — 92134 CPTRZ OPH DX IMG PST SGM RTA: CPT | Performed by: OPHTHALMOLOGY

## 2022-05-02 ASSESSMENT — INTRAOCULAR PRESSURE
OS: 12
OD: 13

## 2022-05-02 NOTE — IMPRESSION/PLAN
Impression: Central retinal vein occlusion, left eye, with macular edema: H34.8120. OS. Condition: improvement. Vision: vision affected. s/p Ozurdex Implant OS 01/26/22, Ozurdex OS 12/08/2020
s/p COREY OS 07/09/2020, AV OS 5/18/2020, AV/Corey OS 11/4/2019, Corey OS 06/10/2019 . [facility] Plan: Discussed diagnosis in detail with patient. Exam OS shows mild edema and OCT OS shows decrease in edema. No treatment is required at this time. Will reassess the retina in 2 - 3 mos. Patient states her eye are itchy OU. Recommend OTC Pataday OU QHS for relief.

## 2022-05-11 ENCOUNTER — OFFICE VISIT (OUTPATIENT)
Dept: URBAN - METROPOLITAN AREA CLINIC 32 | Facility: CLINIC | Age: 77
End: 2022-05-11
Payer: MEDICARE

## 2022-05-11 DIAGNOSIS — H11.30 SUBCONJUNCTIVAL HEMORRHAGE: Primary | ICD-10-CM

## 2022-05-11 PROCEDURE — 99212 OFFICE O/P EST SF 10 MIN: CPT | Performed by: OPTOMETRIST

## 2022-05-11 ASSESSMENT — INTRAOCULAR PRESSURE
OD: 16
OS: 19
OS: 20

## 2022-05-11 NOTE — IMPRESSION/PLAN
Impression: Subconjunctival hemorrhage: H11.30. Plan: Patient advised of all ocular findings. Subconj heme causing ocular redness. No treatment necessary. Patient advised this will re-absorb on its own. Patient may use AT PRN for comfort.

## 2022-10-03 ENCOUNTER — OFFICE VISIT (OUTPATIENT)
Dept: URBAN - METROPOLITAN AREA CLINIC 32 | Facility: CLINIC | Age: 77
End: 2022-10-03
Payer: MEDICARE

## 2022-10-03 DIAGNOSIS — H34.8120 CENTRAL RETINAL VEIN OCCLUSION, LEFT EYE, WITH MACULAR EDEMA: Primary | ICD-10-CM

## 2022-10-03 PROCEDURE — 92134 CPTRZ OPH DX IMG PST SGM RTA: CPT | Performed by: OPHTHALMOLOGY

## 2022-10-03 PROCEDURE — 99214 OFFICE O/P EST MOD 30 MIN: CPT | Performed by: OPHTHALMOLOGY

## 2022-10-03 ASSESSMENT — INTRAOCULAR PRESSURE
OS: 18
OD: 16

## 2022-10-03 NOTE — IMPRESSION/PLAN
Impression: Central retinal vein occlusion, left eye, with macular edema: H34.8120. OS. Condition: worsening. Vision: vision affected. s/p Ozurdex Implant OS 01/26/22, Ozurdex OS 12/08/2020
s/p COREY OS 07/09/2020, AV OS 5/18/2020, AV/Corey OS 11/4/2019, Corey OS 06/10/2019 . Vitaliy Manifold Plan: Discussed diagnosis in detail with patient. Discussed risks of progression. Based on exam and OCT recommend Ozurdex implant LEFT EYE in order to help reduce the swelling and prevent a further reduction in vision. Discussed the risks and benefits of tx including the possibility of increase in IOP. All questions answered. Patient elects to proceed with recommendation. RL1. OCT shows slight increase in CMT OS.

## 2022-10-18 ENCOUNTER — OFFICE VISIT (OUTPATIENT)
Dept: URBAN - METROPOLITAN AREA CLINIC 32 | Facility: CLINIC | Age: 77
End: 2022-10-18
Payer: MEDICARE

## 2022-10-18 DIAGNOSIS — H34.8120 CENTRAL RETINAL VEIN OCCLUSION, LEFT EYE, WITH MACULAR EDEMA: Primary | ICD-10-CM

## 2022-10-18 DIAGNOSIS — H52.4 PRESBYOPIA: ICD-10-CM

## 2022-10-18 PROCEDURE — 99213 OFFICE O/P EST LOW 20 MIN: CPT | Performed by: OPTOMETRIST

## 2022-10-18 ASSESSMENT — VISUAL ACUITY
OS: 20/200
OD: 20/20

## 2022-10-18 ASSESSMENT — INTRAOCULAR PRESSURE
OD: 14
OS: 15

## 2022-10-18 ASSESSMENT — KERATOMETRY
OS: 43.75
OD: 43.25

## 2022-10-18 NOTE — IMPRESSION/PLAN
Impression: Central retinal vein occlusion, left eye, with macular edema: H34.8120. OS. Condition: worsening. Vision: vision affected. s/p Ozurdex Implant OS 01/26/22, Ozurdex OS 12/08/2020
s/p COREY OS 07/09/2020, AV OS 5/18/2020, AV/Corey OS 11/4/2019, Corey OS 06/10/2019 . [facility] Plan: Discussed diagnosis in detail with patient. Discussed risks of progression. continue with Dr Jason Zapata, gave high add bifocal Rx.

## 2022-11-30 ENCOUNTER — SURGERY (OUTPATIENT)
Dept: URBAN - METROPOLITAN AREA SURGERY 15 | Facility: SURGERY | Age: 77
End: 2022-11-30
Payer: MEDICARE

## 2022-11-30 PROCEDURE — 67027 IMPLANT EYE DRUG SYSTEM: CPT | Performed by: OPHTHALMOLOGY

## 2022-12-07 ENCOUNTER — POST-OPERATIVE VISIT (OUTPATIENT)
Dept: URBAN - METROPOLITAN AREA CLINIC 32 | Facility: CLINIC | Age: 77
End: 2022-12-07
Payer: MEDICARE

## 2022-12-07 DIAGNOSIS — Z48.810 ENCOUNTER FOR SURGICAL AFTERCARE FOLLOWING SURGERY ON A SENSE ORGAN: Primary | ICD-10-CM

## 2022-12-07 PROCEDURE — 99024 POSTOP FOLLOW-UP VISIT: CPT | Performed by: OPTOMETRIST

## 2022-12-07 ASSESSMENT — INTRAOCULAR PRESSURE
OS: 14
OD: 15

## 2022-12-07 NOTE — IMPRESSION/PLAN
Impression: S/P Intravitreal Ozurdex Implant OS - 7 Days. Encounter for surgical aftercare following surgery on a sense organ  Z48.810. Condition is improving - Plan: The surgical eye(s) is improving well. Continue to follow current drop plan and post operative instructions. Recommend artificial tears throughout post operative period. RTC for scheduled follow up.

## 2023-01-09 ENCOUNTER — POST-OPERATIVE VISIT (OUTPATIENT)
Dept: URBAN - METROPOLITAN AREA CLINIC 32 | Facility: CLINIC | Age: 78
End: 2023-01-09
Payer: MEDICARE

## 2023-01-09 DIAGNOSIS — Z48.810 ENCOUNTER FOR SURGICAL AFTERCARE FOLLOWING SURGERY ON A SENSE ORGAN: Primary | ICD-10-CM

## 2023-01-09 PROCEDURE — 99024 POSTOP FOLLOW-UP VISIT: CPT | Performed by: OPHTHALMOLOGY

## 2023-01-09 ASSESSMENT — INTRAOCULAR PRESSURE
OD: 20
OS: 21

## 2023-01-09 NOTE — IMPRESSION/PLAN
Impression: S/P Intravitreal Ozurdex Implant OS - 40 Days. Encounter for surgical aftercare following surgery on a sense organ  Z48.810. Excellent post op course; Condition is improving - Plan: OCT OS shows a dramatic reduction in CMT. Exam OS confirms findings. No further treatment is required at this time. Will reassess in 2 - 3 months.

## 2023-02-20 ENCOUNTER — POST-OPERATIVE VISIT (OUTPATIENT)
Dept: URBAN - METROPOLITAN AREA CLINIC 33 | Facility: CLINIC | Age: 78
End: 2023-02-20
Payer: MEDICARE

## 2023-02-20 DIAGNOSIS — Z48.810 ENCOUNTER FOR SURGICAL AFTERCARE FOLLOWING SURGERY ON A SENSE ORGAN: Primary | ICD-10-CM

## 2023-02-20 PROCEDURE — 99024 POSTOP FOLLOW-UP VISIT: CPT | Performed by: OPHTHALMOLOGY

## 2023-02-20 ASSESSMENT — INTRAOCULAR PRESSURE
OD: 12
OS: 17

## 2023-02-20 NOTE — IMPRESSION/PLAN
Impression: S/P Intravitreal Ozurdex Implant OS - 82 Days. Encounter for surgical aftercare following surgery on a sense organ  Z48.810. Excellent post op course   Post operative instructions reviewed - Condition is improving - Plan: Exam OS shows improvement. OCT OS shows a decrease in CMT. Recommend a retina follow - up in 3 mos.

## 2023-05-25 ENCOUNTER — OFFICE VISIT (OUTPATIENT)
Dept: URBAN - METROPOLITAN AREA CLINIC 33 | Facility: CLINIC | Age: 78
End: 2023-05-25
Payer: MEDICARE

## 2023-05-25 DIAGNOSIS — H34.8120 CENTRAL RETINAL VEIN OCCLUSION, LEFT EYE, WITH MACULAR EDEMA: Primary | ICD-10-CM

## 2023-05-25 PROCEDURE — 99214 OFFICE O/P EST MOD 30 MIN: CPT | Performed by: OPHTHALMOLOGY

## 2023-05-25 PROCEDURE — 92134 CPTRZ OPH DX IMG PST SGM RTA: CPT | Performed by: OPHTHALMOLOGY

## 2023-05-25 ASSESSMENT — INTRAOCULAR PRESSURE
OD: 17
OS: 18

## 2023-05-25 NOTE — IMPRESSION/PLAN
Impression: Central retinal vein occlusion, left eye, with macular edema: H34.8120. OS. Condition: worsening. Vision: vision affected. s/p Ozurdex Implant OS 11/30/22, Ozurdex Implant OS 01/26/22, Ozurdex OS 12/08/2020
s/p COREY OS 07/09/2020, AV OS 5/18/2020, AV/Corey OS 11/4/2019, Corey OS 06/10/2019 . Lazara Benitez Plan: Discussed diagnosis in detail with patient. Discussed risks of progression. Based on exam and diagnostic tests recommend Ozurdex implant LEFT EYE in order to help reduce the swelling / bleeding and prevent a further reduction in vision. Discussed the risks and benefits of tx including the possibility of increase in IOP. All questions answered. Patient elects to proceed with recommendation. RL1. OCT shows increase in CMT OS - active fluid and Optos OS shows minimal edema and heme.

## 2023-08-23 ENCOUNTER — SURGERY (OUTPATIENT)
Dept: URBAN - METROPOLITAN AREA SURGERY 15 | Facility: SURGERY | Age: 78
End: 2023-08-23
Payer: MEDICARE

## 2023-08-23 PROCEDURE — 67027 IMPLANT EYE DRUG SYSTEM: CPT | Performed by: OPHTHALMOLOGY

## 2023-08-24 ENCOUNTER — POST-OPERATIVE VISIT (OUTPATIENT)
Dept: URBAN - METROPOLITAN AREA CLINIC 33 | Facility: CLINIC | Age: 78
End: 2023-08-24
Payer: MEDICARE

## 2023-08-24 DIAGNOSIS — Z48.810 ENCOUNTER FOR SURGICAL AFTERCARE FOLLOWING SURGERY ON A SENSE ORGAN: Primary | ICD-10-CM

## 2023-08-24 PROCEDURE — 99024 POSTOP FOLLOW-UP VISIT: CPT

## 2023-08-24 ASSESSMENT — INTRAOCULAR PRESSURE
OD: 18
OS: 18

## 2023-08-31 ENCOUNTER — POST-OPERATIVE VISIT (OUTPATIENT)
Dept: URBAN - METROPOLITAN AREA CLINIC 33 | Facility: CLINIC | Age: 78
End: 2023-08-31
Payer: MEDICARE

## 2023-08-31 DIAGNOSIS — Z48.810 ENCOUNTER FOR SURGICAL AFTERCARE FOLLOWING SURGERY ON A SENSE ORGAN: Primary | ICD-10-CM

## 2023-08-31 PROCEDURE — 99024 POSTOP FOLLOW-UP VISIT: CPT | Performed by: OPHTHALMOLOGY

## 2023-08-31 ASSESSMENT — INTRAOCULAR PRESSURE
OD: 17
OS: 17

## 2023-10-18 ENCOUNTER — OFFICE VISIT (OUTPATIENT)
Dept: URBAN - METROPOLITAN AREA CLINIC 32 | Facility: CLINIC | Age: 78
End: 2023-10-18
Payer: MEDICARE

## 2023-10-18 DIAGNOSIS — H04.123 DRY EYE SYNDROME OF BILATERAL LACRIMAL GLANDS: ICD-10-CM

## 2023-10-18 DIAGNOSIS — H34.8120 CENTRAL RETINAL VEIN OCCLUSION, LEFT EYE, WITH MACULAR EDEMA: Primary | ICD-10-CM

## 2023-10-18 PROCEDURE — 99214 OFFICE O/P EST MOD 30 MIN: CPT | Performed by: OPTOMETRIST

## 2023-10-18 PROCEDURE — 92134 CPTRZ OPH DX IMG PST SGM RTA: CPT | Performed by: OPTOMETRIST

## 2023-10-18 ASSESSMENT — INTRAOCULAR PRESSURE
OD: 10
OS: 10

## 2023-10-18 ASSESSMENT — VISUAL ACUITY
OD: 20/20
OS: 20/200

## 2023-10-26 ENCOUNTER — POST-OPERATIVE VISIT (OUTPATIENT)
Dept: URBAN - METROPOLITAN AREA CLINIC 33 | Facility: CLINIC | Age: 78
End: 2023-10-26
Payer: MEDICARE

## 2023-10-26 DIAGNOSIS — Z48.810 ENCOUNTER FOR SURGICAL AFTERCARE FOLLOWING SURGERY ON A SENSE ORGAN: Primary | ICD-10-CM

## 2023-10-26 PROCEDURE — 99024 POSTOP FOLLOW-UP VISIT: CPT | Performed by: OPHTHALMOLOGY

## 2023-10-26 ASSESSMENT — INTRAOCULAR PRESSURE
OS: 21
OD: 14

## 2023-12-21 ENCOUNTER — OFFICE VISIT (OUTPATIENT)
Dept: URBAN - METROPOLITAN AREA CLINIC 33 | Facility: CLINIC | Age: 78
End: 2023-12-21
Payer: MEDICARE

## 2023-12-21 DIAGNOSIS — H34.8120 CENTRAL RETINAL VEIN OCCLUSION, LEFT EYE, WITH MACULAR EDEMA: Primary | ICD-10-CM

## 2023-12-21 PROCEDURE — 99214 OFFICE O/P EST MOD 30 MIN: CPT | Performed by: OPHTHALMOLOGY

## 2023-12-21 PROCEDURE — 92134 CPTRZ OPH DX IMG PST SGM RTA: CPT | Performed by: OPHTHALMOLOGY

## 2023-12-21 ASSESSMENT — INTRAOCULAR PRESSURE
OS: 15
OD: 15

## 2023-12-27 ENCOUNTER — SURGERY (OUTPATIENT)
Dept: URBAN - METROPOLITAN AREA SURGERY 15 | Facility: SURGERY | Age: 78
End: 2023-12-27
Payer: MEDICARE

## 2023-12-27 PROCEDURE — 67027 IMPLANT EYE DRUG SYSTEM: CPT | Performed by: OPHTHALMOLOGY

## 2024-01-04 ENCOUNTER — POST-OPERATIVE VISIT (OUTPATIENT)
Dept: URBAN - METROPOLITAN AREA CLINIC 33 | Facility: CLINIC | Age: 79
End: 2024-01-04
Payer: MEDICARE

## 2024-01-04 DIAGNOSIS — Z48.810 ENCOUNTER FOR SURGICAL AFTERCARE FOLLOWING SURGERY ON A SENSE ORGAN: Primary | ICD-10-CM

## 2024-01-04 PROCEDURE — 99024 POSTOP FOLLOW-UP VISIT: CPT | Performed by: OPHTHALMOLOGY

## 2024-01-04 ASSESSMENT — INTRAOCULAR PRESSURE
OS: 23
OD: 17

## 2024-03-07 ENCOUNTER — POST-OPERATIVE VISIT (OUTPATIENT)
Dept: URBAN - METROPOLITAN AREA CLINIC 33 | Facility: CLINIC | Age: 79
End: 2024-03-07
Payer: MEDICARE

## 2024-03-07 DIAGNOSIS — Z48.810 ENCOUNTER FOR SURGICAL AFTERCARE FOLLOWING SURGERY ON A SENSE ORGAN: Primary | ICD-10-CM

## 2024-03-07 PROCEDURE — 99024 POSTOP FOLLOW-UP VISIT: CPT | Performed by: OPHTHALMOLOGY

## 2024-03-07 ASSESSMENT — INTRAOCULAR PRESSURE
OS: 24
OD: 13

## 2024-05-09 ENCOUNTER — OFFICE VISIT (OUTPATIENT)
Dept: URBAN - METROPOLITAN AREA CLINIC 33 | Facility: CLINIC | Age: 79
End: 2024-05-09
Payer: MEDICARE

## 2024-05-09 DIAGNOSIS — H34.8120 CENTRAL RETINAL VEIN OCCLUSION, LEFT EYE, WITH MACULAR EDEMA: Primary | ICD-10-CM

## 2024-05-09 PROCEDURE — 99214 OFFICE O/P EST MOD 30 MIN: CPT | Performed by: OPHTHALMOLOGY

## 2024-05-09 PROCEDURE — 92134 CPTRZ OPH DX IMG PST SGM RTA: CPT | Performed by: OPHTHALMOLOGY

## 2024-05-09 ASSESSMENT — INTRAOCULAR PRESSURE
OD: 14
OS: 16

## 2024-05-24 ENCOUNTER — OFFICE VISIT (OUTPATIENT)
Dept: URBAN - METROPOLITAN AREA CLINIC 32 | Facility: CLINIC | Age: 79
End: 2024-05-24
Payer: MEDICARE

## 2024-05-24 DIAGNOSIS — H34.8120 CENTRAL RETINAL VEIN OCCLUSION, LEFT EYE, WITH MACULAR EDEMA: Primary | ICD-10-CM

## 2024-05-24 DIAGNOSIS — H52.4 PRESBYOPIA: ICD-10-CM

## 2024-05-24 DIAGNOSIS — H04.123 DRY EYE SYNDROME OF BILATERAL LACRIMAL GLANDS: ICD-10-CM

## 2024-05-24 PROCEDURE — 99214 OFFICE O/P EST MOD 30 MIN: CPT | Performed by: OPTOMETRIST

## 2024-05-24 ASSESSMENT — VISUAL ACUITY
OS: 20/200
OD: 20/25

## 2024-05-24 ASSESSMENT — INTRAOCULAR PRESSURE
OS: 12
OD: 12

## 2024-05-30 ENCOUNTER — POST-OPERATIVE VISIT (OUTPATIENT)
Dept: URBAN - METROPOLITAN AREA CLINIC 33 | Facility: CLINIC | Age: 79
End: 2024-05-30
Payer: MEDICARE

## 2024-05-30 DIAGNOSIS — Z48.810 ENCOUNTER FOR SURGICAL AFTERCARE FOLLOWING SURGERY ON A SENSE ORGAN: Primary | ICD-10-CM

## 2024-05-30 PROCEDURE — 99024 POSTOP FOLLOW-UP VISIT: CPT

## 2024-05-30 ASSESSMENT — INTRAOCULAR PRESSURE
OS: 18
OD: 17

## 2024-06-06 ENCOUNTER — POST-OPERATIVE VISIT (OUTPATIENT)
Dept: URBAN - METROPOLITAN AREA CLINIC 33 | Facility: CLINIC | Age: 79
End: 2024-06-06
Payer: MEDICARE

## 2024-06-06 DIAGNOSIS — Z48.810 ENCOUNTER FOR SURGICAL AFTERCARE FOLLOWING SURGERY ON A SENSE ORGAN: Primary | ICD-10-CM

## 2024-06-06 PROCEDURE — 99024 POSTOP FOLLOW-UP VISIT: CPT | Performed by: OPHTHALMOLOGY

## 2024-06-06 ASSESSMENT — INTRAOCULAR PRESSURE
OS: 18
OD: 16

## 2024-08-12 ENCOUNTER — POST-OPERATIVE VISIT (OUTPATIENT)
Dept: URBAN - METROPOLITAN AREA CLINIC 33 | Facility: CLINIC | Age: 79
End: 2024-08-12
Payer: MEDICARE

## 2024-08-12 DIAGNOSIS — Z48.810 ENCOUNTER FOR SURGICAL AFTERCARE FOLLOWING SURGERY ON A SENSE ORGAN: Primary | ICD-10-CM

## 2024-08-12 PROCEDURE — 99024 POSTOP FOLLOW-UP VISIT: CPT | Performed by: OPHTHALMOLOGY

## 2024-08-12 ASSESSMENT — INTRAOCULAR PRESSURE
OS: 26
OD: 16

## 2024-10-18 ENCOUNTER — OFFICE VISIT (OUTPATIENT)
Dept: URBAN - METROPOLITAN AREA CLINIC 32 | Facility: CLINIC | Age: 79
End: 2024-10-18
Payer: MEDICARE

## 2024-10-18 DIAGNOSIS — H34.8120 CENTRAL RETINAL VEIN OCCLUSION, LEFT EYE, WITH MACULAR EDEMA: Primary | ICD-10-CM

## 2024-10-18 DIAGNOSIS — H04.123 DRY EYE SYNDROME OF BILATERAL LACRIMAL GLANDS: ICD-10-CM

## 2024-10-18 PROCEDURE — 99214 OFFICE O/P EST MOD 30 MIN: CPT | Performed by: OPTOMETRIST

## 2024-10-18 PROCEDURE — 92134 CPTRZ OPH DX IMG PST SGM RTA: CPT | Performed by: OPTOMETRIST

## 2024-10-18 ASSESSMENT — INTRAOCULAR PRESSURE
OS: 12
OD: 13
OS: 16
OD: 14

## 2024-10-21 ENCOUNTER — OFFICE VISIT (OUTPATIENT)
Dept: URBAN - METROPOLITAN AREA CLINIC 33 | Facility: CLINIC | Age: 79
End: 2024-10-21
Payer: MEDICARE

## 2024-10-21 DIAGNOSIS — H34.8120 CENTRAL RETINAL VEIN OCCLUSION, LEFT EYE, WITH MACULAR EDEMA: Primary | ICD-10-CM

## 2024-10-21 PROCEDURE — 99214 OFFICE O/P EST MOD 30 MIN: CPT | Performed by: OPHTHALMOLOGY

## 2024-10-21 PROCEDURE — 92134 CPTRZ OPH DX IMG PST SGM RTA: CPT | Performed by: OPHTHALMOLOGY

## 2024-10-21 ASSESSMENT — INTRAOCULAR PRESSURE
OD: 19
OS: 19

## 2024-10-30 ENCOUNTER — SURGERY (OUTPATIENT)
Dept: URBAN - METROPOLITAN AREA SURGERY 15 | Facility: SURGERY | Age: 79
End: 2024-10-30
Payer: MEDICARE

## 2024-10-30 PROCEDURE — 67027 IMPLANT EYE DRUG SYSTEM: CPT | Performed by: OPHTHALMOLOGY

## 2024-12-05 ENCOUNTER — POST-OPERATIVE VISIT (OUTPATIENT)
Dept: URBAN - METROPOLITAN AREA CLINIC 33 | Facility: CLINIC | Age: 79
End: 2024-12-05
Payer: MEDICARE

## 2024-12-05 DIAGNOSIS — Z48.810 ENCOUNTER FOR SURGICAL AFTERCARE FOLLOWING SURGERY ON A SENSE ORGAN: Primary | ICD-10-CM

## 2024-12-05 PROCEDURE — 99024 POSTOP FOLLOW-UP VISIT: CPT | Performed by: OPHTHALMOLOGY

## 2025-03-06 ENCOUNTER — OFFICE VISIT (OUTPATIENT)
Dept: URBAN - METROPOLITAN AREA CLINIC 33 | Facility: CLINIC | Age: 80
End: 2025-03-06
Payer: MEDICARE

## 2025-03-06 DIAGNOSIS — H34.8120 CENTRAL RETINAL VEIN OCCLUSION, LEFT EYE, WITH MACULAR EDEMA: Primary | ICD-10-CM

## 2025-03-06 ASSESSMENT — INTRAOCULAR PRESSURE
OD: 16
OS: 19

## 2025-04-10 ENCOUNTER — POST-OPERATIVE VISIT (OUTPATIENT)
Dept: URBAN - METROPOLITAN AREA CLINIC 33 | Facility: CLINIC | Age: 80
End: 2025-04-10

## 2025-04-10 DIAGNOSIS — H52.4 PRESBYOPIA: ICD-10-CM

## 2025-04-10 DIAGNOSIS — Z48.810 ENCOUNTER FOR SURGICAL AFTERCARE FOLLOWING SURGERY ON A SENSE ORGAN: Primary | ICD-10-CM

## 2025-04-10 PROCEDURE — 99024 POSTOP FOLLOW-UP VISIT: CPT

## 2025-04-10 ASSESSMENT — INTRAOCULAR PRESSURE
OD: 19
OS: 19

## 2025-05-01 ENCOUNTER — POST-OPERATIVE VISIT (OUTPATIENT)
Dept: URBAN - METROPOLITAN AREA CLINIC 33 | Facility: CLINIC | Age: 80
End: 2025-05-01

## 2025-05-01 DIAGNOSIS — Z48.810 ENCOUNTER FOR SURGICAL AFTERCARE FOLLOWING SURGERY ON A SENSE ORGAN: Primary | ICD-10-CM

## 2025-05-01 PROCEDURE — 99024 POSTOP FOLLOW-UP VISIT: CPT | Performed by: OPHTHALMOLOGY

## 2025-05-01 ASSESSMENT — INTRAOCULAR PRESSURE
OS: 20
OD: 12

## 2025-06-26 ENCOUNTER — POST-OPERATIVE VISIT (OUTPATIENT)
Dept: URBAN - METROPOLITAN AREA CLINIC 33 | Facility: CLINIC | Age: 80
End: 2025-06-26
Payer: MEDICARE

## 2025-06-26 DIAGNOSIS — Z48.810 ENCOUNTER FOR SURGICAL AFTERCARE FOLLOWING SURGERY ON A SENSE ORGAN: Primary | ICD-10-CM

## 2025-06-26 PROCEDURE — 99024 POSTOP FOLLOW-UP VISIT: CPT | Performed by: OPHTHALMOLOGY

## 2025-06-26 ASSESSMENT — INTRAOCULAR PRESSURE
OD: 16
OS: 23